# Patient Record
Sex: MALE | Race: BLACK OR AFRICAN AMERICAN | URBAN - METROPOLITAN AREA
[De-identification: names, ages, dates, MRNs, and addresses within clinical notes are randomized per-mention and may not be internally consistent; named-entity substitution may affect disease eponyms.]

---
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
## 2025-04-22 ENCOUNTER — HOSPITAL ENCOUNTER (EMERGENCY)
Facility: HOSPITAL | Age: 20
Discharge: HOME | End: 2025-04-22
Attending: STUDENT IN AN ORGANIZED HEALTH CARE EDUCATION/TRAINING PROGRAM
Payer: MEDICAID

## 2025-04-22 VITALS
SYSTOLIC BLOOD PRESSURE: 116 MMHG | TEMPERATURE: 98 F | OXYGEN SATURATION: 99 % | DIASTOLIC BLOOD PRESSURE: 69 MMHG | WEIGHT: 173 LBS | HEIGHT: 65 IN | HEART RATE: 69 BPM | RESPIRATION RATE: 18 BRPM | BODY MASS INDEX: 28.82 KG/M2

## 2025-04-22 DIAGNOSIS — N50.812 LEFT TESTICULAR PAIN: Primary | ICD-10-CM

## 2025-04-22 LAB
ABS NEUT (OLG): 0.76 X10(3)/MCL (ref 2.1–9.2)
ALBUMIN SERPL-MCNC: 4.2 G/DL (ref 3.5–5)
ALBUMIN/GLOB SERPL: 1.1 RATIO (ref 1.1–2)
ALP SERPL-CCNC: 61 UNIT/L (ref 40–150)
ALT SERPL-CCNC: 15 UNIT/L (ref 0–55)
ANION GAP SERPL CALC-SCNC: 7 MEQ/L
AST SERPL-CCNC: 21 UNIT/L (ref 11–45)
BACTERIA #/AREA URNS AUTO: ABNORMAL /HPF
BASOPHILS NFR BLD MANUAL: 0.04 X10(3)/MCL (ref 0–0.2)
BASOPHILS NFR BLD MANUAL: 1 %
BILIRUB SERPL-MCNC: 0.3 MG/DL
BILIRUB UR QL STRIP.AUTO: NEGATIVE
BUN SERPL-MCNC: 20 MG/DL (ref 8.9–20.6)
C TRACH DNA SPEC QL NAA+PROBE: NOT DETECTED
CALCIUM SERPL-MCNC: 9.5 MG/DL (ref 8.4–10.2)
CHLORIDE SERPL-SCNC: 107 MMOL/L (ref 98–107)
CLARITY UR: CLEAR
CO2 SERPL-SCNC: 28 MMOL/L (ref 22–29)
COLOR UR AUTO: COLORLESS
CREAT SERPL-MCNC: 1.07 MG/DL (ref 0.72–1.25)
CREAT/UREA NIT SERPL: 19
EOSINOPHIL NFR BLD MANUAL: 0.12 X10(3)/MCL (ref 0–0.9)
EOSINOPHIL NFR BLD MANUAL: 3 %
ERYTHROCYTE [DISTWIDTH] IN BLOOD BY AUTOMATED COUNT: 11.6 % (ref 11.5–17)
GFR SERPLBLD CREATININE-BSD FMLA CKD-EPI: >60 ML/MIN/1.73/M2
GLOBULIN SER-MCNC: 3.7 GM/DL (ref 2.4–3.5)
GLUCOSE SERPL-MCNC: 83 MG/DL (ref 74–100)
GLUCOSE UR QL STRIP: NORMAL
HCT VFR BLD AUTO: 43.4 % (ref 42–52)
HGB BLD-MCNC: 14.2 G/DL (ref 14–18)
HGB UR QL STRIP: NEGATIVE
INSTRUMENT WBC (OLG): 4 X10(3)/MCL
KETONES UR QL STRIP: NEGATIVE
LEUKOCYTE ESTERASE UR QL STRIP: NEGATIVE
LYMPHOCYTES NFR BLD MANUAL: 2.72 X10(3)/MCL (ref 0.6–4.6)
LYMPHOCYTES NFR BLD MANUAL: 68 %
MCH RBC QN AUTO: 30 PG (ref 27–31)
MCHC RBC AUTO-ENTMCNC: 32.7 G/DL (ref 33–36)
MCV RBC AUTO: 91.8 FL (ref 80–94)
MONOCYTES NFR BLD MANUAL: 0.36 X10(3)/MCL (ref 0.1–1.3)
MONOCYTES NFR BLD MANUAL: 9 %
MUCOUS THREADS URNS QL MICRO: ABNORMAL /LPF
N GONORRHOEA DNA SPEC QL NAA+PROBE: NOT DETECTED
NEUTROPHILS NFR BLD MANUAL: 19 %
NITRITE UR QL STRIP: NEGATIVE
PH UR STRIP: 6 [PH]
PLATELET # BLD AUTO: 185 X10(3)/MCL (ref 130–400)
PLATELET # BLD EST: NORMAL 10*3/UL
PMV BLD AUTO: 10.2 FL (ref 7.4–10.4)
POTASSIUM SERPL-SCNC: 4.1 MMOL/L (ref 3.5–5.1)
PROT SERPL-MCNC: 7.9 GM/DL (ref 6.4–8.3)
PROT UR QL STRIP: NEGATIVE
RBC # BLD AUTO: 4.73 X10(6)/MCL (ref 4.7–6.1)
RBC #/AREA URNS AUTO: ABNORMAL /HPF
RBC MORPH BLD: NORMAL
SODIUM SERPL-SCNC: 142 MMOL/L (ref 136–145)
SP GR UR STRIP.AUTO: 1.01 (ref 1–1.03)
SPECIMEN SOURCE: NORMAL
SQUAMOUS #/AREA URNS LPF: ABNORMAL /HPF
UROBILINOGEN UR STRIP-ACNC: NORMAL
WBC # BLD AUTO: 4 X10(3)/MCL (ref 4.5–11.5)
WBC #/AREA URNS AUTO: ABNORMAL /HPF

## 2025-04-22 PROCEDURE — 99284 EMERGENCY DEPT VISIT MOD MDM: CPT | Mod: 25

## 2025-04-22 PROCEDURE — 85025 COMPLETE CBC W/AUTO DIFF WBC: CPT | Performed by: STUDENT IN AN ORGANIZED HEALTH CARE EDUCATION/TRAINING PROGRAM

## 2025-04-22 PROCEDURE — 81001 URINALYSIS AUTO W/SCOPE: CPT

## 2025-04-22 PROCEDURE — 80053 COMPREHEN METABOLIC PANEL: CPT | Performed by: STUDENT IN AN ORGANIZED HEALTH CARE EDUCATION/TRAINING PROGRAM

## 2025-04-22 PROCEDURE — 87491 CHLMYD TRACH DNA AMP PROBE: CPT

## 2025-04-22 RX ORDER — IBUPROFEN 600 MG/1
600 TABLET, FILM COATED ORAL EVERY 6 HOURS PRN
Qty: 20 TABLET | Refills: 0 | Status: SHIPPED | OUTPATIENT
Start: 2025-04-22

## 2025-04-22 NOTE — DISCHARGE INSTRUCTIONS
Follow-up with your primary care physician.      Follow-up with urology.  Please see contact information.      You may take ibuprofen as needed for pain.      If your pain is severe or worsens or you notice any redness or swelling you need to return to the emergency department for repeat ultrasound.

## 2025-04-22 NOTE — ED NOTES
Pt reports left testicle pain onset this AM without swelling, fever, penile discharge. Reports he saw PCP a few months ago or similar symptoms in right testicle. States he has no history of trauma, surgery to testicles. Denies PMH, home meds. GCS 15. Updated on POC. Call bell in reach.

## 2025-04-22 NOTE — FIRST PROVIDER EVALUATION
Medical screening examination initiated.  I have conducted a focused provider triage encounter, findings are as follows:    Brief history of present illness:  20 year old male presents to ER with c/o left sided testicular pain onset 0700 this morning. Pain worse with movement. Denies difficulty urinating    There were no vitals filed for this visit.    Pertinent physical exam:  awake and alert, nad    Brief workup plan:  ua, US    Preliminary workup initiated; this workup will be continued and followed by the physician or advanced practice provider that is assigned to the patient when roomed.

## 2025-04-22 NOTE — ED PROVIDER NOTES
Encounter Date: 4/22/2025    SCRIBE #1 NOTE: I, Susie Cowart, am scribing for, and in the presence of,  Regis Ordaz MD. I have scribed the following portions of the note - Other sections scribed: HPI, ROS, PE.     History     Chief Complaint   Patient presents with    Testicle Pain     Left sided testicular pain with movement that started at 7am. Pt denies swelling or trauma. Denies penile discharge or dysuria.     Patient is a 20-year-old male presents to the ED for left sided testicle pain beginning ~7:00 this morning. Patient reports his pain is worse with movement. He denies pain currently. He reports history of intermittent right sided testicular pain. He denies any medical or surgical history.     The history is provided by the patient and medical records. No  was used.     Review of patient's allergies indicates:  No Known Allergies  No past medical history on file.  No past surgical history on file.  No family history on file.  Social History[1]  Review of Systems   Constitutional:  Negative for fever.   HENT:  Negative for sore throat.    Eyes:  Negative for visual disturbance.   Respiratory:  Negative for shortness of breath.    Cardiovascular:  Negative for chest pain.   Gastrointestinal:  Negative for abdominal pain.   Genitourinary:  Positive for testicular pain (left sided). Negative for dysuria.   Musculoskeletal:  Negative for joint swelling.   Skin:  Negative for rash.   Neurological:  Negative for weakness.   Psychiatric/Behavioral:  Negative for confusion.    All other systems reviewed and are negative.      Physical Exam     Initial Vitals [04/22/25 1012]   BP Pulse Resp Temp SpO2   121/78 71 18 97.8 °F (36.6 °C) 100 %      MAP       --         Physical Exam    Nursing note and vitals reviewed.  Constitutional: He appears well-developed and well-nourished. He is not diaphoretic. No distress.   HENT:   Head: Normocephalic and atraumatic.   Eyes: Conjunctivae and EOM are  normal. Pupils are equal, round, and reactive to light.   Neck:   Normal range of motion.  Cardiovascular:  Normal rate, regular rhythm, normal heart sounds and intact distal pulses.           No murmur heard.  Pulmonary/Chest: Breath sounds normal. No respiratory distress. He has no wheezes. He has no rales.   Abdominal: Abdomen is soft. He exhibits no distension. There is no abdominal tenderness.   Genitourinary: Left testis shows tenderness.   Musculoskeletal:         General: No tenderness or edema. Normal range of motion.      Cervical back: Normal range of motion.     Neurological: He is alert and oriented to person, place, and time. No cranial nerve deficit.   Skin: Skin is warm and dry. Capillary refill takes less than 2 seconds. No rash noted. No erythema.   Psychiatric: He has a normal mood and affect.       ED Course   Procedures  Labs Reviewed   URINALYSIS, REFLEX TO URINE CULTURE - Abnormal       Result Value    Color, UA Colorless      Appearance, UA Clear      Specific Gravity, UA 1.014      pH, UA 6.0      Protein, UA Negative      Glucose, UA Normal      Ketones, UA Negative      Blood, UA Negative      Bilirubin, UA Negative      Urobilinogen, UA Normal      Nitrites, UA Negative      Leukocyte Esterase, UA Negative      RBC, UA 0-5      WBC, UA None Seen      Bacteria, UA None Seen      Squamous Epithelial Cells, UA None Seen      Mucous, UA Trace (*)    COMPREHENSIVE METABOLIC PANEL - Abnormal    Sodium 142      Potassium 4.1      Chloride 107      CO2 28      Glucose 83      Blood Urea Nitrogen 20.0      Creatinine 1.07      Calcium 9.5      Protein Total 7.9      Albumin 4.2      Globulin 3.7 (*)     Albumin/Globulin Ratio 1.1      Bilirubin Total 0.3      ALP 61      ALT 15      AST 21      eGFR >60      Anion Gap 7.0      BUN/Creatinine Ratio 19     CBC WITH DIFFERENTIAL - Abnormal    WBC 4.00 (*)     RBC 4.73      Hgb 14.2      Hct 43.4      MCV 91.8      MCH 30.0      MCHC 32.7 (*)     RDW  11.6      Platelet 185      MPV 10.2     MANUAL DIFFERENTIAL - Abnormal    WBC 4      Neutrophils % 19      Lymphs % 68      Monocytes % 9      Eosinophils % 3      Basophils % 1      Neutrophils Abs 0.76 (*)     Lymphs Abs 2.72      Monocytes Abs 0.36      Eosinophils Abs 0.12      Basophils Abs 0.04      Platelets Normal      RBC Morph Normal     CHLAMYDIA/GONORRHOEAE(GC), PCR    Chlamydia trachomatis PCR Not Detected      N. gonorrhea PCR Not Detected      Source Urine      Narrative:     The Xpert CT/NG test, performed on the GeneXpert system is a qualitative in vitro real-time polymerase chain reaction (PCR) test for the automated detected and differentiation for genomic DNA from Chlamydia trachomatis (CT) and/or Neisseria gonorrhoeae (NG).   CBC W/ AUTO DIFFERENTIAL    Narrative:     The following orders were created for panel order CBC auto differential.  Procedure                               Abnormality         Status                     ---------                               -----------         ------                     CBC with Differential[[id number]]       Abnormal            Final result               Manual Differential[[id number]]         Abnormal            Final result                 Please view results for these tests on the individual orders.          Imaging Results              US SCROTUM AND TESTICLES WITH DOPPLER (XPD) (Edited Result - FINAL)  Result time 04/22/25 11:18:45      Addendum (preliminary) 1 of 1 by Vanessa Sheridan MD (04/22/25 11:18:45)      Findings discussed with  Marcelo Fine NP, the emergency room clinician caring for patient 4/22/2025 11:00.      Electronically signed by: Vanessa Sheridan  Date:    04/22/2025  Time:    11:18                 Final result by Vanessa Sheridan MD (04/22/25 10:59:46)                   Impression:      Arterial flow demonstrated at the left testicle with normal spectral waveform.  No venous flow demonstrated at the left testicle; a  nonspecific finding possibly technical/technique related.  Component of partial torsion not excluded though the arterial waveform is normal and grayscale appearance of the testicle is normal.  Defer to clinical exam.      Electronically signed by: Vanessa Sheridan  Date:    04/22/2025  Time:    10:59               Narrative:    EXAMINATION:  US SCROTUM AND TESTICLES WITH DOPPLER (XPD)    CLINICAL HISTORY:  left testicular pain;    TECHNIQUE:  Sonography of the scrotum and testes.  Grayscale, color Doppler and spectral Doppler evaluation.    COMPARISON:  None.    FINDINGS:  RIGHT TESTICLE:    Size: 3.1 x 2 x 1.4 cm    Appearance: Normal echotexture. No mass.    Flow: Normal arterial and venous flow.  Arterial flow peak systolic velocity 6 centimeter/second.    Epididymis: Normal.    Hydrocele: Trace simple hydrocele    Varicocele: None.    LEFT TESTICLE:    Size: 3.1 x 2.2 x 1.7 cm    Appearance: Normal echotexture. No mass.    Flow: Normal arterial spectral waveform.  Normal resistive index.  No diastolic reversal.  Arterial flow peak systolic velocity 4 centimeters/second.  Venous flow not demonstrated.    Epididymis: Normal.    Hydrocele: Trace simple hydrocele    Varicocele: None.    OTHER: N/A.                                       Medications - No data to display  Medical Decision Making  Judging by the patient's chief complaint and pertinent history, the patient has the following possible differential diagnoses, including but not limited to the following.  Some of these are deemed to be lower likelihood and some more likely based on my physical exam and history combined with possible lab work and/or imaging studies.   Please see the pertinent studies, and refer to the HPI.  Some of these diagnoses will take further evaluation to fully rule out, perhaps as an outpatient and the patient was encouraged to follow up when discharged for more comprehensive evaluation.    Epididymitis, testicular torsion,  folliculitis, cellulitis, orchitis, abscess, neoplasm, urolithiasis, hernia, traumatic injury    Patient is a 20-year-old male presents to the ED for testicular pain.  See HPI.  See physical exam.  Resolved on my evaluation.  Ultrasound obtained.  As noted.  Discussed case with Urology.  Discussed with Dr. Matt Luke.  Recommends follow-up outpatient in their office.  All results discussed with the patient.  Pain currently controlled.  Discussed all results with the patient.  Discussed strict return precautions.  Discussed if any severe or worsening pain return to the emergency department for repeat ultrasound immediately.  Patient and family verbalized understanding agreed to plan.  Hemodynamically stable for continued outpatient management with strict return precautions.        Problems Addressed:  Left testicular pain: acute illness or injury that poses a threat to life or bodily functions    Amount and/or Complexity of Data Reviewed  Labs: ordered.  Radiology: ordered.    Risk  Prescription drug management.  Parenteral controlled substances.            Scribe Attestation:   Scribe #1: I performed the above scribed service and the documentation accurately describes the services I performed. I attest to the accuracy of the note.    Attending Attestation:           Physician Attestation for Scribe:  Physician Attestation Statement for Scribe #1: I, Regis Ordaz MD, reviewed documentation, as scribed by Susie Cowart in my presence, and it is both accurate and complete.           ED Course as of 05/18/25 0044   Tue Apr 22, 2025   1127 Paged Urologist [MB]   [id number] Discussed with Urologist [MB]   124 Discussed with [facility] Luke.  Has reviewed the case with Dr. Gutierrez.  No evidence of torsion.  Strict return precautions.  Does not require intervention currently.  Recommends follow-up in office. Will set up with follow up appointment.  [RP]      ED Course User Index  [MB] Susie Cowart  [RP] Regis Ordaz MD                            Clinical Impression:  Final diagnoses:  [N50.812] Left testicular pain (Primary)          ED Disposition Condition    Discharge Stable          ED Prescriptions       Medication Sig Dispense Start Date End Date Auth. Provider    ibuprofen (ADVIL,MOTRIN) 600 MG tablet Take 1 tablet (600 mg total) by mouth every 6 (six) hours as needed for Pain. 20 tablet 4/22/2025 -- Regis Ordaz MD          Follow-up Information       Follow up With Specialties Details Why Contact Info    Donny Dangelo MD Internal Medicine   [address]  [facility] [id number] [phone]      Primary Care  Call in 1 day  Please call [phone] for a primary care provider.    Neva Darby MD Urology   [address] [place] [address]  [address]  [facility] [id number] [phone]                 [1]         Regis Ordaz MD  05/19/25 0113

## 2025-09-01 ENCOUNTER — OFFICE VISIT (OUTPATIENT)
Dept: URGENT CARE | Facility: CLINIC | Age: 20
End: 2025-09-01
Payer: MEDICAID

## 2025-09-01 VITALS
DIASTOLIC BLOOD PRESSURE: 71 MMHG | TEMPERATURE: 100 F | BODY MASS INDEX: 29.32 KG/M2 | WEIGHT: 176 LBS | HEART RATE: 79 BPM | HEIGHT: 65 IN | SYSTOLIC BLOOD PRESSURE: 125 MMHG | OXYGEN SATURATION: 99 % | RESPIRATION RATE: 18 BRPM

## 2025-09-01 DIAGNOSIS — R05.9 COUGH, UNSPECIFIED TYPE: Primary | ICD-10-CM

## 2025-09-01 DIAGNOSIS — U07.1 COVID: ICD-10-CM

## 2025-09-01 DIAGNOSIS — R11.0 NAUSEA: ICD-10-CM

## 2025-09-01 LAB
CTP QC/QA: YES
SARS-COV+SARS-COV-2 AG RESP QL IA.RAPID: POSITIVE

## 2025-09-01 PROCEDURE — S0119 ONDANSETRON 4 MG: HCPCS | Mod: ,,, | Performed by: NUTRITIONIST

## 2025-09-01 PROCEDURE — 87811 SARS-COV-2 COVID19 W/OPTIC: CPT | Mod: QW,,, | Performed by: NUTRITIONIST

## 2025-09-01 PROCEDURE — 99203 OFFICE O/P NEW LOW 30 MIN: CPT | Mod: ,,, | Performed by: NUTRITIONIST

## 2025-09-01 RX ORDER — ONDANSETRON 8 MG/1
8 TABLET, ORALLY DISINTEGRATING ORAL EVERY 8 HOURS PRN
Qty: 10 TABLET | Refills: 0 | Status: SHIPPED | OUTPATIENT
Start: 2025-09-01

## 2025-09-01 RX ORDER — ONDANSETRON 4 MG/1
4 TABLET, ORALLY DISINTEGRATING ORAL
Status: COMPLETED | OUTPATIENT
Start: 2025-09-01 | End: 2025-09-01

## 2025-09-01 RX ORDER — FLUTICASONE PROPIONATE 50 MCG
1 SPRAY, SUSPENSION (ML) NASAL 2 TIMES DAILY
Qty: 9.9 ML | Refills: 0 | Status: SHIPPED | OUTPATIENT
Start: 2025-09-01

## 2025-09-01 RX ADMIN — ONDANSETRON 4 MG: 4 TABLET, ORALLY DISINTEGRATING ORAL at 01:09
